# Patient Record
Sex: FEMALE | Race: OTHER | Employment: UNEMPLOYED | URBAN - METROPOLITAN AREA
[De-identification: names, ages, dates, MRNs, and addresses within clinical notes are randomized per-mention and may not be internally consistent; named-entity substitution may affect disease eponyms.]

---

## 2022-03-25 PROBLEM — N94.89 ADNEXAL MASS: Status: ACTIVE | Noted: 2019-12-26

## 2022-03-25 PROBLEM — O36.80X0 PREGNANCY OF UNKNOWN ANATOMIC LOCATION: Status: ACTIVE | Noted: 2020-09-27

## 2022-03-25 PROBLEM — R49.0 CHRONIC HOARSENESS: Status: ACTIVE | Noted: 2021-12-10

## 2022-03-25 PROBLEM — E66.9 OBESITY WITH BODY MASS INDEX 30 OR GREATER: Status: ACTIVE | Noted: 2017-04-22

## 2022-03-25 PROBLEM — N39.0 URINARY TRACT INFECTION: Status: ACTIVE | Noted: 2020-09-13

## 2022-04-24 PROBLEM — N39.0 URINARY TRACT INFECTION: Status: RESOLVED | Noted: 2020-09-13 | Resolved: 2022-04-24

## 2023-10-19 ENCOUNTER — APPOINTMENT (OUTPATIENT)
Dept: CT IMAGING | Age: 38
End: 2023-10-19

## 2023-10-19 ENCOUNTER — HOSPITAL ENCOUNTER (EMERGENCY)
Age: 38
Discharge: HOME | End: 2023-10-19

## 2023-10-19 VITALS
WEIGHT: 169.4 LBS | RESPIRATION RATE: 17 BRPM | HEIGHT: 63 IN | OXYGEN SATURATION: 100 % | DIASTOLIC BLOOD PRESSURE: 87 MMHG | HEART RATE: 72 BPM | SYSTOLIC BLOOD PRESSURE: 125 MMHG | TEMPERATURE: 98.4 F | BODY MASS INDEX: 30.02 KG/M2

## 2023-10-19 DIAGNOSIS — G89.29 CHRONIC NONINTRACTABLE HEADACHE, UNSPECIFIED HEADACHE TYPE: Primary | ICD-10-CM

## 2023-10-19 DIAGNOSIS — R51.9 CHRONIC NONINTRACTABLE HEADACHE, UNSPECIFIED HEADACHE TYPE: Primary | ICD-10-CM

## 2023-10-19 LAB
ANION GAP SERPL CALC-SCNC: 4 MMOL/L (ref 2–11)
BASOPHILS # BLD: 0 K/UL (ref 0–0.2)
BASOPHILS NFR BLD: 1 % (ref 0–2)
BUN SERPL-MCNC: 10 MG/DL (ref 6–23)
CALCIUM SERPL-MCNC: 9.5 MG/DL (ref 8.3–10.4)
CHLORIDE SERPL-SCNC: 110 MMOL/L (ref 101–110)
CO2 SERPL-SCNC: 26 MMOL/L (ref 21–32)
CREAT SERPL-MCNC: 0.8 MG/DL (ref 0.6–1)
DIFFERENTIAL METHOD BLD: ABNORMAL
EOSINOPHIL # BLD: 0 K/UL (ref 0–0.8)
EOSINOPHIL NFR BLD: 1 % (ref 0.5–7.8)
ERYTHROCYTE [DISTWIDTH] IN BLOOD BY AUTOMATED COUNT: 13.1 % (ref 11.9–14.6)
GLUCOSE SERPL-MCNC: 98 MG/DL (ref 65–100)
HCT VFR BLD AUTO: 41.7 % (ref 35.8–46.3)
HGB BLD-MCNC: 13.6 G/DL (ref 11.7–15.4)
IMM GRANULOCYTES # BLD AUTO: 0 K/UL (ref 0–0.5)
IMM GRANULOCYTES NFR BLD AUTO: 0 % (ref 0–5)
LYMPHOCYTES # BLD: 1.7 K/UL (ref 0.5–4.6)
LYMPHOCYTES NFR BLD: 28 % (ref 13–44)
MCH RBC QN AUTO: 29.3 PG (ref 26.1–32.9)
MCHC RBC AUTO-ENTMCNC: 32.6 G/DL (ref 31.4–35)
MCV RBC AUTO: 89.9 FL (ref 82–102)
MONOCYTES # BLD: 0.5 K/UL (ref 0.1–1.3)
MONOCYTES NFR BLD: 8 % (ref 4–12)
NEUTS SEG # BLD: 3.8 K/UL (ref 1.7–8.2)
NEUTS SEG NFR BLD: 62 % (ref 43–78)
NRBC # BLD: 0 K/UL (ref 0–0.2)
PLATELET # BLD AUTO: 356 K/UL (ref 150–450)
PMV BLD AUTO: 9 FL (ref 9.4–12.3)
POTASSIUM SERPL-SCNC: 3.9 MMOL/L (ref 3.5–5.1)
RBC # BLD AUTO: 4.64 M/UL (ref 4.05–5.2)
SODIUM SERPL-SCNC: 140 MMOL/L (ref 133–143)
WBC # BLD AUTO: 6.1 K/UL (ref 4.3–11.1)

## 2023-10-19 PROCEDURE — 70450 CT HEAD/BRAIN W/O DYE: CPT

## 2023-10-19 PROCEDURE — 80048 BASIC METABOLIC PNL TOTAL CA: CPT

## 2023-10-19 PROCEDURE — 99284 EMERGENCY DEPT VISIT MOD MDM: CPT

## 2023-10-19 PROCEDURE — 6360000002 HC RX W HCPCS

## 2023-10-19 PROCEDURE — 85025 COMPLETE CBC W/AUTO DIFF WBC: CPT

## 2023-10-19 PROCEDURE — 96374 THER/PROPH/DIAG INJ IV PUSH: CPT

## 2023-10-19 RX ORDER — BUTALBITAL, ACETAMINOPHEN AND CAFFEINE 50; 325; 40 MG/1; MG/1; MG/1
1 TABLET ORAL EVERY 4 HOURS PRN
Qty: 20 TABLET | Refills: 0 | Status: SHIPPED | OUTPATIENT
Start: 2023-10-19

## 2023-10-19 RX ORDER — KETOROLAC TROMETHAMINE 15 MG/ML
15 INJECTION, SOLUTION INTRAMUSCULAR; INTRAVENOUS ONCE
Status: COMPLETED | OUTPATIENT
Start: 2023-10-19 | End: 2023-10-19

## 2023-10-19 RX ADMIN — KETOROLAC TROMETHAMINE 15 MG: 15 INJECTION, SOLUTION INTRAMUSCULAR; INTRAVENOUS at 09:36

## 2023-10-19 ASSESSMENT — LIFESTYLE VARIABLES
HOW MANY STANDARD DRINKS CONTAINING ALCOHOL DO YOU HAVE ON A TYPICAL DAY: PATIENT DOES NOT DRINK
HOW OFTEN DO YOU HAVE A DRINK CONTAINING ALCOHOL: NEVER

## 2023-10-19 ASSESSMENT — ENCOUNTER SYMPTOMS
NAUSEA: 1
CHEST TIGHTNESS: 0
SHORTNESS OF BREATH: 0
DIARRHEA: 0
ABDOMINAL PAIN: 0
VOMITING: 0
COLOR CHANGE: 0

## 2023-10-19 ASSESSMENT — PAIN - FUNCTIONAL ASSESSMENT: PAIN_FUNCTIONAL_ASSESSMENT: 0-10

## 2023-10-19 ASSESSMENT — PAIN SCALES - GENERAL: PAINLEVEL_OUTOF10: 8

## 2023-10-19 ASSESSMENT — PAIN DESCRIPTION - LOCATION: LOCATION: HEAD

## 2023-10-19 ASSESSMENT — PAIN DESCRIPTION - DESCRIPTORS: DESCRIPTORS: ACHING;DISCOMFORT;THROBBING

## 2023-10-19 ASSESSMENT — PAIN DESCRIPTION - PAIN TYPE: TYPE: CHRONIC PAIN

## 2023-10-19 ASSESSMENT — PAIN DESCRIPTION - FREQUENCY: FREQUENCY: INTERMITTENT

## 2023-10-19 NOTE — ED TRIAGE NOTES
Pt reports intermittent headaches [id number] that can be throbbing and cause ringing in ear and pain behind eyes.  Current headache yesterday [id number] that resolved then returned overnight.   (-)vision changes (+)nausea, dizziness     A&Ox4

## 2023-10-19 NOTE — ED PROVIDER NOTES
Emergency Department Provider Note       PCP: No primary care provider on file. Age: 42 y.o. Sex: female     DISPOSITION Decision To Discharge 10/19/2023 11:01:30 AM       ICD-10-CM    1. Chronic nonintractable headache, unspecified headache type  R51.9 [address] [facility]    G89.29           Medical Decision Making     Complexity of Problems Addressed:  1 stable chronic illness    Data Reviewed and Analyzed:  I independently ordered and reviewed each unique test.         I interpreted the CT Scan no acute findings. Discussion of management or test interpretation. Patient presents with 10-year history of intermittent bitemporal headaches. Most recent episode described as gradual onset, bitemporal and consistent with prior headaches. She request CT imaging. Patient arrives vitally stable in no acute distress. Her neurological exam is nonfocal.  There is no meningismus. There is no tenderness over the temporal area to suggest GCA. There is no TMJ tenderness. Suspect migraine disorder given associated nausea and retro-orbital pressure. I could not find any prior medical records from this patient therefore will obtain baseline labs and CT imaging given multiyear history of recurrent headaches. Labs and imaging ultimately unremarkable. Patient feeling improved upon reevaluation. She is requesting referral to neurology for further evaluation. We will plan for discharge. I will prescribe a short-term course of Fioricet. Return precautions discussed. Patient understanding and agreeable. Risk of Complications and/or Morbidity of Patient Management:  Prescription drug management performed. History       Patient is a 38-year-old female with no stated past medical history presenting to the emergency department for evaluation of persistent headaches. Patient reports history of intermittent headaches for the past 10 years.   She describes headaches as a throbbing

## 2023-11-28 RX ORDER — ACETAMINOPHEN 500 MG
500 TABLET ORAL EVERY 6 HOURS PRN
COMMUNITY

## 2023-11-29 ENCOUNTER — OFFICE VISIT (OUTPATIENT)
Dept: NEUROLOGY | Age: 38
End: 2023-11-29

## 2023-11-29 VITALS
HEART RATE: 83 BPM | BODY MASS INDEX: 31.5 KG/M2 | OXYGEN SATURATION: 98 % | DIASTOLIC BLOOD PRESSURE: 74 MMHG | WEIGHT: 171.2 LBS | HEIGHT: 62 IN | SYSTOLIC BLOOD PRESSURE: 114 MMHG

## 2023-11-29 DIAGNOSIS — Z87.898 HISTORY OF PALPITATIONS: ICD-10-CM

## 2023-11-29 DIAGNOSIS — H04.123 BILATERAL DRY EYES: ICD-10-CM

## 2023-11-29 DIAGNOSIS — G44.221 CHRONIC TENSION-TYPE HEADACHE, INTRACTABLE: Primary | ICD-10-CM

## 2023-11-29 DIAGNOSIS — H93.11 TINNITUS OF RIGHT EAR: ICD-10-CM

## 2023-11-29 DIAGNOSIS — Z72.820 POOR SLEEP: ICD-10-CM

## 2023-11-29 PROBLEM — K64.4 ANAL SKIN TAG: Status: ACTIVE | Noted: 2020-07-09

## 2023-11-29 PROBLEM — R14.0 ABDOMINAL BLOATING: Status: ACTIVE | Noted: 2019-02-07

## 2023-11-29 PROBLEM — L60.0 INGROWING TOENAIL: Status: ACTIVE | Noted: 2022-06-09

## 2023-11-29 PROBLEM — B35.1 ONYCHOMYCOSIS OF TOENAIL: Status: ACTIVE | Noted: 2022-10-24

## 2023-11-29 PROBLEM — G47.00 INSOMNIA: Status: ACTIVE | Noted: 2017-04-22

## 2023-11-29 PROBLEM — N92.6 IRREGULAR PERIODS: Status: ACTIVE | Noted: 2019-02-07

## 2023-11-29 PROBLEM — N64.89 BREASTS ASYMMETRICAL: Status: ACTIVE | Noted: 2018-01-19

## 2023-11-29 PROBLEM — H10.10 ALLERGIC CONJUNCTIVITIS: Status: ACTIVE | Noted: 2017-11-01

## 2023-11-29 PROBLEM — M67.432 GANGLION OF LEFT WRIST: Status: ACTIVE | Noted: 2019-07-24

## 2023-11-29 PROBLEM — N83.299 COMPLEX OVARIAN CYST: Status: ACTIVE | Noted: 2022-10-24

## 2023-11-29 PROBLEM — R63.5 UNINTENDED WEIGHT GAIN: Status: ACTIVE | Noted: 2020-07-09

## 2023-11-29 PROBLEM — G89.29 CHRONIC HEADACHE DISORDER: Status: ACTIVE | Noted: 2017-04-22

## 2023-11-29 PROBLEM — R51.9 CHRONIC HEADACHE DISORDER: Status: ACTIVE | Noted: 2017-04-22

## 2023-11-29 PROBLEM — R73.03 PREDIABETES: Status: ACTIVE | Noted: 2020-10-14

## 2023-11-29 PROCEDURE — 99204 OFFICE O/P NEW MOD 45 MIN: CPT | Performed by: NURSE PRACTITIONER

## 2023-11-29 RX ORDER — NAPROXEN 500 MG/1
500 TABLET ORAL 2 TIMES DAILY PRN
Qty: 60 TABLET | Refills: 1 | Status: SHIPPED | OUTPATIENT
Start: 2023-11-29

## 2023-11-29 RX ORDER — PROPRANOLOL HCL 60 MG
60 CAPSULE, EXTENDED RELEASE 24HR ORAL DAILY
Qty: 30 CAPSULE | Refills: 3 | Status: SHIPPED | OUTPATIENT
Start: 2023-11-29

## 2023-11-29 ASSESSMENT — PATIENT HEALTH QUESTIONNAIRE - PHQ9
SUM OF ALL RESPONSES TO PHQ QUESTIONS 1-9: 0
SUM OF ALL RESPONSES TO PHQ QUESTIONS 1-9: 0
2. FEELING DOWN, DEPRESSED OR HOPELESS: 0
1. LITTLE INTEREST OR PLEASURE IN DOING THINGS: 0
SUM OF ALL RESPONSES TO PHQ QUESTIONS 1-9: 0
SUM OF ALL RESPONSES TO PHQ9 QUESTIONS 1 & 2: 0
SUM OF ALL RESPONSES TO PHQ QUESTIONS 1-9: 0

## 2023-11-29 ASSESSMENT — ENCOUNTER SYMPTOMS
EYE ITCHING: 0
ALLERGIC/IMMUNOLOGIC NEGATIVE: 1
EYE DISCHARGE: 0
PHOTOPHOBIA: 1
NAUSEA: 1
EYE PAIN: 0
RESPIRATORY NEGATIVE: 1
EYE REDNESS: 0

## 2023-11-29 NOTE — PROGRESS NOTES
to patient's size, iterative reconstruction. FINDINGS:     No evidence of intracranial mass, hemorrhage, or large territorial infarct. The  ventricles are normal in size and position. The basal cisterns are patent. No  extra-axial fluid collection or mass effect. The orbital contents are within normal limits. The paranasal sinuses are clear. The mastoid air cells and middle ears are clear. No significant osseous or extracranial soft tissue lesions. IMPRESSION:  1. No evidence of acute intracranial abnormality. Lab Results   Component Value Date    WBC 6.1 10/19/2023    HGB 13.6 10/19/2023    HCT 41.7 10/19/2023    MCV 89.9 10/19/2023     10/19/2023    LYMPHOPCT 28 10/19/2023    RBC 4.64 10/19/2023    MCH 29.3 10/19/2023    MCHC 32.6 10/19/2023    RDW 13.1 10/19/2023       Lab Results   Component Value Date/Time     10/19/2023 09:36 AM    K 3.9 10/19/2023 09:36 AM     10/19/2023 09:36 AM    CO2 26 10/19/2023 09:36 AM    BUN 10 10/19/2023 09:36 AM    CREATININE 0.80 10/19/2023 09:36 AM    GLUCOSE 98 10/19/2023 09:36 AM    CALCIUM 9.5 10/19/2023 09:36 AM    LABGLOM >60 10/19/2023 09:36 AM          Dede Walker was seen today for ed follow-up and other. Diagnoses and all orders for this visit:    Chronic tension-type headache, intractable  Will obtain MRI to rule out organic etiologies. Start on propranolol LA 60 mg daily for headache prevention. Medication and side effects discussed. Start on Naproxen 500 mg twice daily as needed for headache abortive therapy. Take with food. Medication and side effects discussed. Given migrainous features, sample of nurtec ODT 75 mg provided to patient in office. Advised  to update office on efficacy. Instructions:  Nurtec ODT 75 mg daily as needed for migraine abortive therapy. Not to exceed 75mg/24 hours. -     MRI BRAIN WO CONTRAST; Future  -     propranolol (INDERAL LA) 60 MG extended release capsule;  Take 1 capsule by mouth daily  -

## 2023-11-29 NOTE — PATIENT INSTRUCTIONS
Headache Education:   Instructed the patient on over-the-counter headache management medications including: CoQ10, magnesium oxide, and butterbur. To avoid a pain medication overuse headache trying not to take pain medicines more than 3 doses a week. Avoid use of Fioricet or opioids to treat headaches as this can increase risk for rebound headaches. To help relieve headache symptoms without taking pain medicine lie down under darkroom and drink glass of water. Consider lifestyle modification including good sleep hygiene, routine medial schedules, regular exercise and managing triggers. Keep a headache diary  to reveal triggers and possible patterns. Triggers may be: Food, stress, perfumes, alcohol, or even chocolate. Drink plenty of water and try to get 8 hours of sleep each night to reduce risk factors that may cause headaches. Samples of Nurtec ODT 75 mg provided to patient. Advised  to update office on efficacy. Instructions:  Nurtec ODT 75 mg daily as needed for migraine abortive therapy. Not to exceed 75mg/24 hours.

## 2024-01-15 ENCOUNTER — HOSPITAL ENCOUNTER (OUTPATIENT)
Dept: MRI IMAGING | Age: 39
Discharge: HOME | End: 2024-01-18

## 2024-01-15 DIAGNOSIS — G44.221 CHRONIC TENSION-TYPE HEADACHE, INTRACTABLE: ICD-10-CM

## 2024-01-15 PROCEDURE — 70551 MRI BRAIN STEM W/O DYE: CPT

## 2024-01-24 ENCOUNTER — OFFICE VISIT (OUTPATIENT)
Dept: NEUROLOGY | Age: 39
End: 2024-01-24

## 2024-01-24 VITALS
BODY MASS INDEX: 33.23 KG/M2 | HEIGHT: 62 IN | HEART RATE: 65 BPM | DIASTOLIC BLOOD PRESSURE: 89 MMHG | WEIGHT: 180.6 LBS | OXYGEN SATURATION: 99 % | SYSTOLIC BLOOD PRESSURE: 146 MMHG

## 2024-01-24 DIAGNOSIS — G44.221 CHRONIC TENSION-TYPE HEADACHE, INTRACTABLE: Primary | ICD-10-CM

## 2024-01-24 PROCEDURE — 99215 OFFICE O/P EST HI 40 MIN: CPT | Performed by: NURSE PRACTITIONER

## 2024-01-24 RX ORDER — DIVALPROEX SODIUM 500 MG/1
500 TABLET, EXTENDED RELEASE ORAL DAILY
Qty: 30 TABLET | Refills: 3 | Status: SHIPPED | OUTPATIENT
Start: 2024-02-23

## 2024-01-24 RX ORDER — DIVALPROEX SODIUM 250 MG/1
TABLET, EXTENDED RELEASE ORAL
Qty: 53 TABLET | Refills: 0 | Status: SHIPPED | OUTPATIENT
Start: 2024-01-24 | End: 2024-02-23

## 2024-01-24 ASSESSMENT — PATIENT HEALTH QUESTIONNAIRE - PHQ9
SUM OF ALL RESPONSES TO PHQ QUESTIONS 1-9: 0
2. FEELING DOWN, DEPRESSED OR HOPELESS: 0
1. LITTLE INTEREST OR PLEASURE IN DOING THINGS: 0
SUM OF ALL RESPONSES TO PHQ9 QUESTIONS 1 & 2: 0

## 2024-01-24 ASSESSMENT — ENCOUNTER SYMPTOMS
RESPIRATORY NEGATIVE: 1
PHOTOPHOBIA: 1
ALLERGIC/IMMUNOLOGIC NEGATIVE: 1
NAUSEA: 1

## 2024-01-24 NOTE — PATIENT INSTRUCTIONS
Headache Education:   Instructed the patient on over-the-counter headache management medications including: CoQ10, magnesium oxide, and butterbur.  To avoid a pain medication overuse headache trying not to take pain medicines more than 3 doses a week.   Avoid use of Fioricet or opioids to treat headaches as this can increase risk for rebound headaches.   To help relieve headache symptoms without taking pain medicine lie down under darkroom and drink glass of water.  Consider lifestyle modification including good sleep hygiene, routine medial schedules, regular exercise and managing triggers.  Keep a headache diary  to reveal triggers and possible patterns.  Triggers may be: Food, stress, perfumes, alcohol, or even chocolate.  Drink plenty of water and try to get 8 hours of sleep each night to reduce risk factors that may cause headaches.    Samples of  Ubrelvy 100 mg provided to patient. Advised  to update office on efficacy.   Instructions:  Ubrelvy 100 mg daily as needed for migraine abortive therapy. May repeat for 1 dose in 2 hours if needed. Not to exceed 200mg/24 hours.     Notify office if you become pregnant.

## 2024-01-24 NOTE — PROGRESS NOTES
Telephone call to patient. Recent lab tests were discussed. Patient's recent urinalysis looked pretty good but did have 5 white cells. Patient's urine culture did show the patient to have between 10 and 50,000 colonies of nonhemolytic strep.   I will sen is intact to finger rustle bilaterally. Motor examination - There is normal muscle tone and bulk. Power is full throughout. Muscle stretch reflexes are normoactive and there are no pathological reflexes present. Sensation is intact to light touch, pinprick, vibration and proprioception in all extremities. Cerebellar examination is normal. Gait and stance are normal.       Most recent MRI - I personally reviewed this image   MRI Result (most recent):  MRI BRAIN WO CONTRAST 01/15/2024    Narrative  PROCEDURE:  MRI BRAIN WO CONTRAST    REASON FOR PROCEDURE:  Chronic tension-type headache, intractable    COMPARISON: None available .    TECHNIQUE:  Multiplanar multisequence MRI images of the brain obtained without IV contrast.    FINDINGS:  Ventricles and sulci are normal in size and shape.  No mass, mass effect or midline shift.  Gray white differentiation preserved.  No restricted diffusion or intracranial hemorrhage.  Major intracranial flow voids are preserved. Paranasal sinuses are clear.  Orbits are normal.  White matter signal is normal.    Impression  Normal MRI of the brain.        Naheed was seen today for follow-up and headache.    Diagnoses and all orders for this visit:    Chronic tension-type headache, intractable  MRI of brain reassuring. Negative for acute intracranial abnormalities.   Discontinue propranolol due to ineffectiveness.   ? rebound component due to taking abortive therapy despite not having headache in addition to poor sleep.     Start on depakote  mg daily for 7 days, then increase to 500 mg daily. Medication and side effects discussed.   She has been advised to notify office if she becomes pregnant as this medication will have to stopped.   Continue Naproxen 500 mg twice daily as needed for headache.     Samples of Ubrelvy 100 mg provided to patient. Advised  to update office on efficacy.   Instructions:  Ubrelvy 100 mg daily as needed for migraine abortive therapy. May repeat for 1

## 2024-01-28 DIAGNOSIS — G44.221 CHRONIC TENSION-TYPE HEADACHE, INTRACTABLE: ICD-10-CM

## 2024-01-29 RX ORDER — NAPROXEN 500 MG/1
TABLET ORAL
Qty: 60 TABLET | Refills: 0 | OUTPATIENT
Start: 2024-01-29

## 2024-04-26 ENCOUNTER — TELEPHONE (OUTPATIENT)
Dept: NEUROLOGY | Age: 39
End: 2024-04-26

## 2024-07-17 ENCOUNTER — OFFICE VISIT (OUTPATIENT)
Dept: NEUROLOGY | Age: 39
End: 2024-07-17

## 2024-07-17 VITALS
OXYGEN SATURATION: 98 % | BODY MASS INDEX: 34.82 KG/M2 | SYSTOLIC BLOOD PRESSURE: 105 MMHG | HEIGHT: 62 IN | DIASTOLIC BLOOD PRESSURE: 69 MMHG | WEIGHT: 189.2 LBS | HEART RATE: 72 BPM

## 2024-07-17 DIAGNOSIS — G47.00 INSOMNIA, UNSPECIFIED TYPE: ICD-10-CM

## 2024-07-17 DIAGNOSIS — G44.221 CHRONIC TENSION-TYPE HEADACHE, INTRACTABLE: Primary | ICD-10-CM

## 2024-07-17 PROCEDURE — 99214 OFFICE O/P EST MOD 30 MIN: CPT | Performed by: NURSE PRACTITIONER

## 2024-07-17 RX ORDER — NAPROXEN 500 MG/1
500 TABLET ORAL 2 TIMES DAILY PRN
Qty: 60 TABLET | Refills: 1 | Status: SHIPPED | OUTPATIENT
Start: 2024-07-17

## 2024-07-17 RX ORDER — AMITRIPTYLINE HYDROCHLORIDE 25 MG/1
25 TABLET, FILM COATED ORAL NIGHTLY
Qty: 90 TABLET | Refills: 1 | Status: SHIPPED | OUTPATIENT
Start: 2024-07-17

## 2024-07-17 RX ORDER — IBUPROFEN 200 MG
400 TABLET ORAL EVERY 6 HOURS PRN
COMMUNITY
End: 2024-07-17

## 2024-07-17 ASSESSMENT — PATIENT HEALTH QUESTIONNAIRE - PHQ9
SUM OF ALL RESPONSES TO PHQ QUESTIONS 1-9: 0
SUM OF ALL RESPONSES TO PHQ QUESTIONS 1-9: 0
SUM OF ALL RESPONSES TO PHQ9 QUESTIONS 1 & 2: 0
1. LITTLE INTEREST OR PLEASURE IN DOING THINGS: NOT AT ALL
SUM OF ALL RESPONSES TO PHQ QUESTIONS 1-9: 0
SUM OF ALL RESPONSES TO PHQ QUESTIONS 1-9: 0
2. FEELING DOWN, DEPRESSED OR HOPELESS: NOT AT ALL

## 2024-07-17 ASSESSMENT — ENCOUNTER SYMPTOMS
PHOTOPHOBIA: 1
ALLERGIC/IMMUNOLOGIC NEGATIVE: 1
NAUSEA: 1
RESPIRATORY NEGATIVE: 1

## 2024-07-17 NOTE — PROGRESS NOTES
[facility] [address], [address]  [place] [address]  [phone]      Chief Complaint   Patient presents with    Follow-up     Headache       Naheed Cruz is a 38 y.o. female who presents follow up for headache.     Vietnamese speaking, phone  present: Homer # [phone]    Interval history:    On previous visit, she was started on depakote for preventative therapy and samples of ubrelvy and nurtec were provided. She has been taking naproxen 500 mg daily as needed for abortive therapy. Since previous visit, she has noted improvement in the frequency of headaches.     She endorses headache today, located in right frontal region and diffusely radiates. Current frequency ~15 days. Severity 5/10. She took advil this morning prior to visit. Describes as throbbing. Wakening with headaches.   Associated with nausea, vertigo \" described as room spinning\".  Alleviated with migraine mask. Advil or naproxen.     Denies recent illness or changes in medication.      Hx of insomnia- has difficulty falling asleep.     Drinks decaf coffee. Denies tea or soft drinks. Drinks mostly water, ~2 L.        Past Medical History:   Diagnosis Date    Headache        Past Surgical History:   Procedure Laterality Date    CYST REMOVAL      left ovary    FALLOPIAN TUBE SURGERY Left     OVARY REMOVAL Left        Family History   Problem Relation Age of Onset    No Known Problems Mother     No Known Problems Father     Hypertension Maternal Grandmother     Diabetes Paternal Grandmother        Social History     Socioeconomic History    Marital status:    Tobacco Use    Smoking status: Never    Smokeless tobacco: Never   Substance and Sexual Activity    Alcohol use: Never    Drug use: Never         Current Outpatient Medications:     divalproex (DEPAKOTE ER) 250 MG extended release tablet, Take 1 tablet by mouth daily for 7 days, THEN 2 tablets daily for 23 days., Disp: 53 tablet, Rfl: 0

## 2024-07-17 NOTE — PATIENT INSTRUCTIONS
Headache Education:   Instructed the patient on over-the-counter headache management medications including: CoQ10, magnesium oxide, and butterbur.  To avoid a pain medication overuse headache trying not to take pain medicines more than 3 doses a week.   Avoid use of Fioricet or opioids to treat headaches as this can increase risk for rebound headaches.   To help relieve headache symptoms without taking pain medicine lie down under darkroom and drink glass of water.  Consider lifestyle modification including good sleep hygiene, routine medial schedules, regular exercise and managing triggers.  Keep a headache diary  to reveal triggers and possible patterns.  Triggers may be: Food, stress, perfumes, alcohol, or even chocolate.  Drink plenty of water and try to get 8 hours of sleep each night to reduce risk factors that may cause headaches.

## 2024-09-16 DIAGNOSIS — G44.221 CHRONIC TENSION-TYPE HEADACHE, INTRACTABLE: ICD-10-CM

## 2024-09-16 RX ORDER — NAPROXEN 500 MG/1
TABLET ORAL
Qty: 60 TABLET | Refills: 0 | OUTPATIENT
Start: 2024-09-16

## 2025-02-06 ENCOUNTER — TELEPHONE (OUTPATIENT)
Dept: NEUROLOGY | Age: 40
End: 2025-02-06

## 2025-02-06 NOTE — TELEPHONE ENCOUNTER
FYI   Patient came in today wanting an appointment . Also to bring letter stating what her domestic partner income is . The letter only was typed out by someone and stated the days and hours he work and number to call. Not an amount .  Informed patient we could not use this type out letter . Asked if he filled taxes ? She stated yes. Patient is aware since she is not working he could bring in his last 2 years taxes , pay check stub, or even back account for all living in household. So we can  try and see if she could get assistance     Discussed again headache Education     Went over the appointment needed to be scheduled at [facility]. Don't know why it wasn't scheduled at last appointment . Scheduled with YANN Manzanares  Per NP Rice  Given migrainous features, will start on Nurtec ODT 75 mg daily as needed for migraine. Not to exceed 75 mg daily. She is currently unfunded, will start patient assistance process.    Also went over with patient and given more samples

## 2025-03-04 ENCOUNTER — OFFICE VISIT (OUTPATIENT)
Dept: NEUROLOGY | Age: 40
End: 2025-03-04

## 2025-03-04 VITALS
BODY MASS INDEX: 33.65 KG/M2 | OXYGEN SATURATION: 97 % | WEIGHT: 184 LBS | SYSTOLIC BLOOD PRESSURE: 124 MMHG | DIASTOLIC BLOOD PRESSURE: 87 MMHG | HEART RATE: 84 BPM

## 2025-03-04 DIAGNOSIS — G43.E09 CHRONIC MIGRAINE WITH AURA WITHOUT STATUS MIGRAINOSUS, NOT INTRACTABLE: Primary | ICD-10-CM

## 2025-03-04 PROCEDURE — 99215 OFFICE O/P EST HI 40 MIN: CPT | Performed by: NURSE PRACTITIONER

## 2025-03-04 RX ORDER — ATOGEPANT 60 MG/1
60 TABLET ORAL DAILY
Qty: 30 TABLET | Refills: 2 | Status: SHIPPED | OUTPATIENT
Start: 2025-03-04 | End: 2025-04-03

## 2025-03-04 RX ORDER — UBROGEPANT 100 MG/1
100 TABLET ORAL PRN
Qty: 16 TABLET | Refills: 3 | Status: SHIPPED | OUTPATIENT
Start: 2025-03-04

## 2025-03-04 NOTE — PROGRESS NOTES
Naheed Cruz is a 39 y.o. female who presents with headaches.    CC: Headache      : Phan [phone]    Previously seen by Norma Perkins NP        HPI:      Headaches are located bitemporal, frontal, and radiate vertex of head, and neck  They began 12 years ago.  The current frequency of headaches: can occur daily but may go 1-2 weeks without headaches but is never fully without a headache.  Duration: hours to days.  Severity is mild to severe, varies . Quality of headaches are described as throbbing.  Associated symptoms: nausea,bi-clear eye drainage, no visual aura, dizziness, phonophobia, photophobia, tinnitus, allodynia, numbness, global weakness.    Factors that relieve the headaches are she is using be teri migraine patches. NSAIDs, will take every 2 days. Most bothersome migraine symptoms throbbing. Previous Imaging Normal Mri Brain.     She cannot tolerate wearing her hair up because this worsened her headaches    She stopped nurtec every other day 1.5 months ago, it was not working.     She does consume approx 2L water per day.       No history of constipation.      She does have a desire to have a future pregnancy, hasn't taken any formation of birth control pills for 7 years, she has been trying for 4 years. She has seen infertility in the past. She is aware there are limited options for migraine prevention that can be used during pregnancy.       She has had a left tubal and left ooprectomy, history of endometriosis        MEDICATION TRIALS:         Prophylactics/Preventives::    amitrytpyline, Depakote, nurtec , propranolol     Abortant meds::   acetaminophen 1000mg, advil, naproxen, nurtec      Previous Imaging:   CT Result (most recent):  CT HEAD WO CONTRAST 10/19/2023    Narrative  EXAM: CT head without contrast.  INDICATION: Intermittent headache for 10 years with throbbing and ringing in the  ear and pain behind the eyes.  COMPARISON: None.    Multiple axial images obtained

## 2025-03-04 NOTE — ASSESSMENT & PLAN NOTE
Advised patient to discontinue NSAIDS and tylenol as this is negatively impacting her overall migraine frequency.       Start Qulipta, once daily for prevention. Regarding a pregnancy, discussed that this medication would need to be stopped in the event of a pregnancy. Start Ubrevley at onset of migraine. Take 1 tablet at onset of migraine, may be repeated in 2 hours for a max of 2 doses in 24 hours.       D/c nurtec as she could not obtain this.     Filled out [employer] patient assistance forms while patient in office.

## 2025-06-25 ENCOUNTER — TELEPHONE (OUTPATIENT)
Dept: NEUROLOGY | Age: 40
End: 2025-06-25

## 2025-06-27 ENCOUNTER — OFFICE VISIT (OUTPATIENT)
Dept: NEUROLOGY | Age: 40
End: 2025-06-27

## 2025-06-27 VITALS
DIASTOLIC BLOOD PRESSURE: 75 MMHG | BODY MASS INDEX: 34.2 KG/M2 | WEIGHT: 187 LBS | SYSTOLIC BLOOD PRESSURE: 112 MMHG | OXYGEN SATURATION: 96 % | HEART RATE: 80 BPM

## 2025-06-27 DIAGNOSIS — G43.E09 CHRONIC MIGRAINE WITH AURA WITHOUT STATUS MIGRAINOSUS, NOT INTRACTABLE: Primary | ICD-10-CM

## 2025-06-27 DIAGNOSIS — R51.9 LEFT FACIAL PAIN: ICD-10-CM

## 2025-06-27 PROCEDURE — 99214 OFFICE O/P EST MOD 30 MIN: CPT | Performed by: NURSE PRACTITIONER

## 2025-06-27 NOTE — ASSESSMENT & PLAN NOTE
Called [employer] while patient in office. Patient needs to provide them with income and number of people living in the house. I did provide her with Qulipta and Ubrevely samples. Discussed that its reasonable to continue with this medication as she did see some improvement with the samples. Additionally, she does not have insurance.     Gayatri for rescue. Take 1 tablet at onset of migraine, may be repeated in 2 hours for a max of 2 doses in 24 hours.     She will need to notify me if she does not get approved for [employer] patient assistance

## 2025-06-27 NOTE — PROGRESS NOTES
Naheed Cruz is a 39 y.o. female who presents with headaches.    CC: Headache      : Phan [phone]    Previously seen by Norma Perkins NP    Accompanied by family       History of Present Illness  The patient presents for evaluation of headaches and left ear, jaw, and facial pain.     She has been experiencing daily headaches, with the most severe episode occurring yesterday. The duration of her headaches varies. She has been managing her symptoms with over-the-counter medications, which provide some relief but do not completely alleviate the pain. She has also been taking natural vitamins for the past 4 months, which have reduced the intensity of her headaches. I reviewed the bottle and this is a vitamin which has complex B vitamin.     She has not yet started the Qulipta treatment due to its high cost. She did not receive any samples of Qulipta during her last visit. Despite completing the necessary paperwork in 03/2025 to obtain the medication from the company, she has not received any communication from them. She reports that while on Qulipta, she experienced fewer headaches compared to when she was only taking vitamins.    She began experiencing pain in her left ear on 05/17/2025, which subsequently spread to her jaw, front teeth, and the left side of her face. This pain has been persistent, with periods of relief lasting only a few days before the pain returns. The pain is so severe that it disrupts her sleep and causes her to cry. She is unable to eat on the affected side and experiences pain in her ear and jaw when chewing solid food. She has been using eardrops for a suspected infection, even though she was informed that there is no infection present. She also reports sensitivity to loud noises, which exacerbate her ear pain. She sought emergency care where x-rays were performed, and she was advised to consult with neurology. A subsequent dental consultation ruled out any dental

## 2025-06-27 NOTE — ASSESSMENT & PLAN NOTE
TN v atypical facial pain v migraine variant. Will start with treating migraines and an MRI with and without contrast with special attention to left TN.

## 2025-06-27 NOTE — CONSULTS
Session ID: [id number]  Session Duration: Longer than 54 minutes  Language: Kinyarwanda   ID: #[id number]   Name: Phan